# Patient Record
Sex: MALE | Race: WHITE | NOT HISPANIC OR LATINO | URBAN - METROPOLITAN AREA
[De-identification: names, ages, dates, MRNs, and addresses within clinical notes are randomized per-mention and may not be internally consistent; named-entity substitution may affect disease eponyms.]

---

## 2018-04-05 ENCOUNTER — OFFICE VISIT - HEALTHEAST (OUTPATIENT)
Dept: FAMILY MEDICINE | Facility: CLINIC | Age: 33
End: 2018-04-05

## 2018-04-05 DIAGNOSIS — R13.10 DYSPHAGIA, UNSPECIFIED TYPE: ICD-10-CM

## 2021-06-01 VITALS — WEIGHT: 190.2 LBS | BODY MASS INDEX: 26.53 KG/M2

## 2021-06-17 NOTE — PROGRESS NOTES
"ASSESSMENT:   1. Dysphagia, unspecified type  viscous lidocaine HC 2 % solution 15 mL       PLAN:  32-year-old male presents for evaluation of dysphagia sensation.  Notes that he \"inhaled a cough drop\" earlier today has experienced difficulty swallowing since that time.  Notes that he feels like he has a lump in his throat, and has had trouble swallowing his saliva.  He was given some viscous lidocaine here in the clinic, and developed a worsening dysphagia sensation and a feeling as though something was \"stuck\".  Exam here is essentially unremarkable, patient is in no distress respiratory distress, lungs are clear to auscultation.  No pharyngitis is noted.  Patient is very anxious regarding this sensation, I did try to discuss with him that this could likely be an esophagitis from swallowing the cough drop, but he is very uncomfortable with this.  He would like imaging.  I did discuss with him that I would need to send him to the emergency department for this, and he does verbalize good understanding.  I discussed this with the attending ED provider, and patient is sent to the emergency department from the clinic here.    I discussed red flag symptoms, return precautions to clinic/ER and follow up care with patient/parent.  Expected clinical course, symptomatic cares advised. Questions answered. Patient/parent amenable with plan.    Patient Instructions:  Patient Instructions   Go the ER as you may need a CT scan of your neck.      SUBJECTIVE:   Kiko Conti is a 32 y.o. male who presents today with complaints that he inadvertently swallowed a cough drop earlier today and feels it is still stuck in his throat.  No difficulty swallowing, however feels a discomfort in his throat.  No vomiting.  No difficulty breathing.      ROS:  Comprehensive 12 pt ROS completed, positives noted in HPI, otherwise negative.      Past Medical History:  There is no problem list on file for this patient.      Surgical History:  No " past surgical history on file.        Family History:  No family history on file.    Reviewed; Non-contributory    History   Smoking Status     Never Smoker   Smokeless Tobacco     Never Used       Current Medications:  Current Outpatient Prescriptions on File Prior to Visit   Medication Sig Dispense Refill     dextroamphetamine-amphetamine (ADDERALL XR) 20 MG 24 hr capsule Take 20 mg by mouth daily.       EPINEPHrine (EPIPEN) 0.3 mg/0.3 mL atIn Inject 0.3 mL (0.3 mg total) into the shoulder, thigh, or buttocks as needed. 3 Pre-filled Pen Syringe 0     esomeprazole (NEXIUM) 20 MG capsule Take 20 mg by mouth daily as needed.       triamcinolone (KENALOG) 0.1 % ointment Apply 1 application topically 2 (two) times a day as needed.       prochlorperazine (COMPAZINE) 5 MG tablet 1-2 tabs po q8 hours as needed for nausea 20 tablet 0     No current facility-administered medications on file prior to visit.        Allergies:   Allergies   Allergen Reactions     Doxycycline Hives     Keflex [Cephalexin] Hives       OBJECTIVE:   Vitals:    04/05/18 2000   BP: 104/64   Patient Site: Right Arm   Patient Position: Sitting   Cuff Size: Adult Regular   Pulse: 80   Resp: 18   Temp: 98.5  F (36.9  C)   TempSrc: Oral   SpO2: 99%   Weight: 190 lb 3.2 oz (86.3 kg)     Physical exam reveals a pleasant 32 y.o. male.   Appears alert and mild distress. Non-toxic appearance.  Mouth:  Mucosa pink and moist.  normal-appearing mucosa and no pharyngitis, no exudate. No trismus. No evidence of PTA. Normal phonation.  Neck: supple  Lungs: Chest is clear, no wheezing, rhonchi or rales. Symmetric air entry throughout both lung fields.  Heart: regular rate and rhythm, no murmur, rub or gallop  Abdomen: soft, nontender. No masses or organomegaly  Skin: pink, warm, dry, and without lesions on limited skin exam. No rashes.     RADIOLOGY    none  LABORATORY STUDIES    none      Rain Kevin, CNP